# Patient Record
Sex: MALE | Race: WHITE | NOT HISPANIC OR LATINO | Employment: OTHER | URBAN - METROPOLITAN AREA
[De-identification: names, ages, dates, MRNs, and addresses within clinical notes are randomized per-mention and may not be internally consistent; named-entity substitution may affect disease eponyms.]

---

## 2023-05-08 ENCOUNTER — HOSPITAL ENCOUNTER (OUTPATIENT)
Dept: DATA CONVERSION | Facility: HOSPITAL | Age: 68
End: 2023-05-08
Attending: INTERNAL MEDICINE
Payer: MEDICARE

## 2023-05-08 DIAGNOSIS — Z95.1 PRESENCE OF AORTOCORONARY BYPASS GRAFT: ICD-10-CM

## 2023-05-08 DIAGNOSIS — K63.89 OTHER SPECIFIED DISEASES OF INTESTINE: ICD-10-CM

## 2023-05-08 DIAGNOSIS — K57.30 DIVERTICULOSIS OF LARGE INTESTINE WITHOUT PERFORATION OR ABSCESS WITHOUT BLEEDING: ICD-10-CM

## 2023-05-08 DIAGNOSIS — I25.10 ATHEROSCLEROTIC HEART DISEASE OF NATIVE CORONARY ARTERY WITHOUT ANGINA PECTORIS: ICD-10-CM

## 2023-05-08 DIAGNOSIS — Z86.010 PERSONAL HISTORY OF COLONIC POLYPS: ICD-10-CM

## 2023-05-08 DIAGNOSIS — Z12.11 ENCOUNTER FOR SCREENING FOR MALIGNANT NEOPLASM OF COLON: ICD-10-CM

## 2023-05-08 DIAGNOSIS — E78.5 HYPERLIPIDEMIA, UNSPECIFIED: ICD-10-CM

## 2023-05-08 DIAGNOSIS — I10 ESSENTIAL (PRIMARY) HYPERTENSION: ICD-10-CM

## 2023-05-19 LAB
COMPLETE PATHOLOGY REPORT: NORMAL
CONVERTED CLINICAL DIAGNOSIS-HISTORY: NORMAL
CONVERTED FINAL DIAGNOSIS: NORMAL
CONVERTED FINAL REPORT PDF LINK TO COPY AND PASTE: NORMAL
CONVERTED GROSS DESCRIPTION: NORMAL

## 2023-08-31 ENCOUNTER — HOSPITAL ENCOUNTER (OUTPATIENT)
Dept: DATA CONVERSION | Facility: HOSPITAL | Age: 68
Discharge: HOME | End: 2023-08-31
Payer: MEDICARE

## 2023-08-31 DIAGNOSIS — I25.10 ATHEROSCLEROTIC HEART DISEASE OF NATIVE CORONARY ARTERY WITHOUT ANGINA PECTORIS: ICD-10-CM

## 2023-09-11 ENCOUNTER — HOSPITAL ENCOUNTER (OUTPATIENT)
Dept: DATA CONVERSION | Facility: HOSPITAL | Age: 68
Discharge: HOME | End: 2023-09-11
Payer: MEDICARE

## 2023-09-11 DIAGNOSIS — E78.5 HYPERLIPIDEMIA, UNSPECIFIED: ICD-10-CM

## 2023-09-11 LAB
ALT SERPL-CCNC: 27 U/L (ref 5–40)
APPEARANCE PLAS: ABNORMAL
AST SERPL-CCNC: 30 U/L (ref 5–40)
CHOLEST SERPL-MCNC: 229 MG/DL (ref 133–200)
CHOLEST/HDLC SERPL: 4.5 RATIO
COLOR SPUN FLD: ABNORMAL
FASTING STATUS PATIENT QL REPORTED: ABNORMAL
HDLC SERPL-MCNC: 51 MG/DL
LDLC SERPL CALC-MCNC: 151 MG/DL (ref 65–130)
TRIGL SERPL-MCNC: 133 MG/DL (ref 40–150)

## 2023-09-15 VITALS
RESPIRATION RATE: 15 BRPM | SYSTOLIC BLOOD PRESSURE: 100 MMHG | BODY MASS INDEX: 30.51 KG/M2 | DIASTOLIC BLOOD PRESSURE: 60 MMHG | HEART RATE: 56 BPM | WEIGHT: 206 LBS | HEIGHT: 69 IN

## 2023-11-03 PROBLEM — D64.9 ANEMIA: Status: ACTIVE | Noted: 2023-11-03

## 2023-11-03 PROBLEM — K63.5 COLON POLYP: Status: ACTIVE | Noted: 2023-11-03

## 2023-11-03 PROBLEM — N20.0 NEPHROLITHIASIS: Status: ACTIVE | Noted: 2023-11-03

## 2023-11-03 PROBLEM — I25.10 CORONARY ARTERY DISEASE: Status: ACTIVE | Noted: 2023-11-03

## 2023-11-03 PROBLEM — K92.1 HEMATOCHEZIA: Status: ACTIVE | Noted: 2023-11-03

## 2023-11-03 PROBLEM — E66.9 OBESITY: Status: ACTIVE | Noted: 2023-11-03

## 2023-11-03 PROBLEM — E66.3 OVERWEIGHT: Status: ACTIVE | Noted: 2023-11-03

## 2023-11-03 PROBLEM — Z95.1 HISTORY OF CORONARY ARTERY BYPASS GRAFT: Status: ACTIVE | Noted: 2023-11-03

## 2023-11-03 PROBLEM — M1A.10X0 LEAD-INDUCED CHRONIC GOUT, UNSPECIFIED SITE, WITHOUT TOPHUS (TOPHI): Status: ACTIVE | Noted: 2023-11-03

## 2023-11-03 PROBLEM — M10.9 GOUT: Status: ACTIVE | Noted: 2023-11-03

## 2023-11-03 PROBLEM — H26.9 CATARACTS, BOTH EYES: Status: ACTIVE | Noted: 2023-11-03

## 2023-11-03 PROBLEM — J98.11 ATELECTASIS: Status: ACTIVE | Noted: 2023-11-03

## 2023-11-03 PROBLEM — E78.5 HYPERLIPIDEMIA: Status: ACTIVE | Noted: 2023-11-03

## 2023-11-03 PROBLEM — K52.9 COLITIS: Status: ACTIVE | Noted: 2023-11-03

## 2023-11-03 PROBLEM — H43.811 PVD (POSTERIOR VITREOUS DETACHMENT), RIGHT EYE: Status: ACTIVE | Noted: 2023-11-03

## 2023-11-03 PROBLEM — M1A.00X0 IDIOPATHIC CHRONIC GOUT WITHOUT TOPHUS: Status: ACTIVE | Noted: 2023-11-03

## 2023-11-03 PROBLEM — F41.9 ANXIETY: Status: ACTIVE | Noted: 2023-11-03

## 2023-11-03 PROBLEM — T56.0X1A LEAD-INDUCED CHRONIC GOUT, UNSPECIFIED SITE, WITHOUT TOPHUS (TOPHI): Status: ACTIVE | Noted: 2023-11-03

## 2023-11-03 PROBLEM — I25.810 ARTERIOSCLEROSIS OF CORONARY ARTERY BYPASS GRAFT: Status: ACTIVE | Noted: 2023-11-03

## 2023-11-03 PROBLEM — M10.9 ACUTE GOUT: Status: ACTIVE | Noted: 2023-11-03

## 2023-11-03 PROBLEM — E78.2 MIXED HYPERLIPIDEMIA: Status: ACTIVE | Noted: 2023-11-03

## 2023-11-03 PROBLEM — T56.0X1S: Status: ACTIVE | Noted: 2023-11-03

## 2023-11-03 PROBLEM — D31.31 CHOROIDAL NEVUS OF RIGHT EYE: Status: ACTIVE | Noted: 2023-11-03

## 2023-11-03 RX ORDER — METOPROLOL TARTRATE 25 MG/1
0.5 TABLET, FILM COATED ORAL 2 TIMES DAILY
COMMUNITY
End: 2024-01-04

## 2023-11-03 RX ORDER — CLOPIDOGREL BISULFATE 75 MG/1
1 TABLET ORAL DAILY
COMMUNITY

## 2023-11-03 RX ORDER — EZETIMIBE 10 MG/1
1 TABLET ORAL DAILY
COMMUNITY
Start: 2022-08-18

## 2023-11-03 RX ORDER — POLYETHYLENE GLYCOL-3350 AND ELECTROLYTES 236; 6.74; 5.86; 2.97; 22.74 G/274.31G; G/274.31G; G/274.31G; G/274.31G; G/274.31G
POWDER, FOR SOLUTION ORAL
COMMUNITY
Start: 2021-08-05 | End: 2023-11-06 | Stop reason: ALTCHOICE

## 2023-11-03 RX ORDER — ATORVASTATIN CALCIUM 40 MG/1
40 TABLET, FILM COATED ORAL DAILY
COMMUNITY
Start: 2023-02-11 | End: 2023-05-12 | Stop reason: WASHOUT

## 2023-11-03 RX ORDER — POLYETHYLENE GLYCOL 3350 17 G/17G
POWDER, FOR SOLUTION ORAL
COMMUNITY
Start: 2021-08-05 | End: 2023-11-06 | Stop reason: ALTCHOICE

## 2023-11-03 RX ORDER — VIT C/E/ZN/COPPR/LUTEIN/ZEAXAN 250MG-90MG
CAPSULE ORAL
COMMUNITY

## 2023-11-03 RX ORDER — ALLOPURINOL 100 MG/1
1 TABLET ORAL DAILY
COMMUNITY

## 2023-11-03 RX ORDER — EVOLOCUMAB 140 MG/ML
INJECTION, SOLUTION SUBCUTANEOUS
COMMUNITY
Start: 2023-09-15 | End: 2023-11-06

## 2023-11-06 ENCOUNTER — OFFICE VISIT (OUTPATIENT)
Dept: PRIMARY CARE | Facility: CLINIC | Age: 68
End: 2023-11-06
Payer: MEDICARE

## 2023-11-06 VITALS
HEART RATE: 78 BPM | OXYGEN SATURATION: 97 % | SYSTOLIC BLOOD PRESSURE: 122 MMHG | HEIGHT: 70 IN | DIASTOLIC BLOOD PRESSURE: 68 MMHG | BODY MASS INDEX: 29.49 KG/M2 | WEIGHT: 206 LBS

## 2023-11-06 DIAGNOSIS — Z11.59 ENCOUNTER FOR HEPATITIS C SCREENING TEST FOR LOW RISK PATIENT: ICD-10-CM

## 2023-11-06 DIAGNOSIS — I25.810 ATHEROSCLEROSIS OF CORONARY ARTERY BYPASS GRAFT OF NATIVE HEART WITHOUT ANGINA PECTORIS: ICD-10-CM

## 2023-11-06 DIAGNOSIS — Z00.00 ROUTINE GENERAL MEDICAL EXAMINATION AT HEALTH CARE FACILITY: Primary | ICD-10-CM

## 2023-11-06 DIAGNOSIS — Z12.5 SCREENING PSA (PROSTATE SPECIFIC ANTIGEN): ICD-10-CM

## 2023-11-06 LAB
POC APPEARANCE, URINE: CLEAR
POC BILIRUBIN, URINE: NEGATIVE
POC BLOOD, URINE: NEGATIVE
POC COLOR, URINE: YELLOW
POC GLUCOSE, URINE: NEGATIVE MG/DL
POC KETONES, URINE: NEGATIVE MG/DL
POC LEUKOCYTES, URINE: NEGATIVE
POC NITRITE,URINE: NEGATIVE
POC PH, URINE: 5.5 PH
POC PROTEIN, URINE: ABNORMAL MG/DL
POC SPECIFIC GRAVITY, URINE: >=1.03
POC UROBILINOGEN, URINE: 0.2 EU/DL

## 2023-11-06 PROCEDURE — 1036F TOBACCO NON-USER: CPT | Performed by: FAMILY MEDICINE

## 2023-11-06 PROCEDURE — G0439 PPPS, SUBSEQ VISIT: HCPCS | Performed by: FAMILY MEDICINE

## 2023-11-06 PROCEDURE — 1126F AMNT PAIN NOTED NONE PRSNT: CPT | Performed by: FAMILY MEDICINE

## 2023-11-06 PROCEDURE — 81003 URINALYSIS AUTO W/O SCOPE: CPT | Performed by: FAMILY MEDICINE

## 2023-11-06 PROCEDURE — 1159F MED LIST DOCD IN RCRD: CPT | Performed by: FAMILY MEDICINE

## 2023-11-06 RX ORDER — MULTIVIT-MIN/IRON FUM/FOLIC AC 7.5 MG-4
1 TABLET ORAL DAILY
COMMUNITY

## 2023-11-06 ASSESSMENT — ENCOUNTER SYMPTOMS
CARDIOVASCULAR NEGATIVE: 1
EYES NEGATIVE: 1
MUSCULOSKELETAL NEGATIVE: 1
RESPIRATORY NEGATIVE: 1
PSYCHIATRIC NEGATIVE: 1
HEMATOLOGIC/LYMPHATIC NEGATIVE: 1
GASTROINTESTINAL NEGATIVE: 1
NEUROLOGICAL NEGATIVE: 1
CONSTITUTIONAL NEGATIVE: 1
ENDOCRINE NEGATIVE: 1

## 2023-11-06 ASSESSMENT — PAIN SCALES - GENERAL: PAINLEVEL: 0-NO PAIN

## 2023-11-06 ASSESSMENT — PATIENT HEALTH QUESTIONNAIRE - PHQ9
SUM OF ALL RESPONSES TO PHQ9 QUESTIONS 1 AND 2: 0
1. LITTLE INTEREST OR PLEASURE IN DOING THINGS: NOT AT ALL
2. FEELING DOWN, DEPRESSED OR HOPELESS: NOT AT ALL

## 2023-11-06 ASSESSMENT — COGNITIVE AND FUNCTIONAL STATUS - GENERAL: VERBAL FLUENCY - ANIMAL NAMES (0 TO 25): 3

## 2023-11-06 NOTE — PROGRESS NOTES
[facility]: [facility]  MEDICARE WELLNESS EXAM      Michael Schwarz is a 67 y.o. male that is presenting today for Annual Exam.    Concerns:    Subjective   Chest pains -felt the same as his heart attack pain, lasted 10s at a time.  Called cardiology and saw Dr Arnold in August. Symptoms resolved by follow up in Sept.     Mixed hyperlipidemia-  had LFT elevation on atorvostatin so they stopped it.  Repatha was ordered but too expensive.       Review of Systems   Constitutional: Negative.    HENT: Negative.     Eyes: Negative.    Respiratory: Negative.     Cardiovascular: Negative.    Gastrointestinal: Negative.    Endocrine: Negative.    Genitourinary: Negative.    Musculoskeletal: Negative.    Skin:  Positive for rash.   Neurological: Negative.    Hematological: Negative.    Psychiatric/Behavioral: Negative.         Other Providers:  Dr Arnold.     Hearing Changes: no    Cognitive Assessment: mini-cog    Depression Screening: negative     ACTIVITIES OF DAILY LIVING:  Basic ADLs:  Problems with Bathing, Dressing, Toileting, Transferring, Continence, Feeding No  Instrumental ADLs:  No problems with Ability to use phone, Shopping, Cooking, House-keeping, Laundry, Transportation, Medication Management, Finance Management No    Advanced Care Planning was discussed with patient:  The patient does not have an advanced care plan on file. The patient does not have an active surrogate decision-maker on file.    History    No past medical history on file.  No past surgical history on file.  Family History   Problem Relation Name Age of Onset    Stroke Mother      Diabetes Father      Heart disease Father      Arthritis Sister      No Known Problems Sister      Other (mini stroke) Brother      Stroke Brother      No Known Problems Daughter      No Known Problems Son      No Known Problems Son       No Known Allergies  Current Outpatient Medications on File Prior to Visit   Medication Sig Dispense Refill     allopurinol (Zyloprim) 100 mg tablet Take 1 tablet (100 mg) by mouth once daily.      cholecalciferol (Vitamin D-3) 25 MCG (1000 UT) capsule 1 capsule Orally Once a day      clopidogrel (Plavix) 75 mg tablet Take 1 tablet (75 mg) by mouth once daily.      ezetimibe (Zetia) 10 mg tablet Take 1 tablet (10 mg) by mouth once daily.      metoprolol tartrate (Lopressor) 25 mg tablet Take 0.5 tablets (12.5 mg) by mouth 2 times a day.      multivitamin with minerals tablet Take 1 tablet by mouth once daily.      evolocumab (Repatha Syringe) 140 mg/mL injection 1 mL Subcutaneous every 2 weeks for 30 days      polyethylene glycol (Glycolax, Miralax) 17 gram/dose powder USE AS DIRECTED: Qty 1 X 238 GM Bottle      polyethylene glycol-electrolytes (GaviLyte-G) 420 gram solution Please follow the printed instructions that were mailed to you.      [DISCONTINUED] atorvastatin (Lipitor) 40 mg tablet Take 1 tablet (40 mg) by mouth once daily.       No current facility-administered medications on file prior to visit.     Immunization History   Administered Date(s) Administered    Flu vaccine (IIV4), preservative free *Check age/dose* 09/15/2020    Flu vaccine, quadrivalent, high-dose, preservative free, age 65y+ (FLUZONE) 09/15/2021, 10/02/2023    Hepatitis B vaccine, adult (RECOMBIVAX, ENGERIX) 04/05/2019    Hepatitis B vaccine, pediatric/adolescent (RECOMBIVAX, ENGERIX) 10/05/2018, 11/05/2018    Influenza, Seasonal, Quadrivalent, Adjuvanted 01/11/2023    Pfizer COVID-19 vaccine, Fall 2023, 12 years and older, (30mcg/0.3mL) 10/02/2023    Pfizer COVID-19 vaccine, bivalent, age 12 years and older (30 mcg/0.3 mL) 01/12/2023    Pfizer Gray Cap SARS-CoV-2 06/15/2022    Pfizer Purple Cap SARS-CoV-2 02/26/2021, 03/26/2021, 11/12/2021    Pneumococcal conjugate vaccine, 13-valent (PREVNAR 13) 05/13/2021    Tdap vaccine, age 7 year and older (BOOSTRIX) 04/15/2016    Zoster vaccine, recombinant, adult (SHINGRIX) 10/27/2020    Zoster, live  04/15/2016     Patient's medical history was reviewed and updated either before or during this encounter.    Objective   Vitals:    11/06/23 1415   BP: 122/68   Pulse: 78   SpO2: 97%      Physical Exam  Constitutional:       General: He is not in acute distress.     Appearance: Normal appearance. He is not ill-appearing.   HENT:      Right Ear: Tympanic membrane, ear canal and external ear normal. There is no impacted cerumen.      Left Ear: Tympanic membrane, ear canal and external ear normal.      Nose: Nose normal.      Mouth/Throat:      Pharynx: Oropharynx is clear. No posterior oropharyngeal erythema.   Neck:      Thyroid: No thyroid mass or thyroid tenderness.      Vascular: No carotid bruit.   Cardiovascular:      Rate and Rhythm: Normal rate and regular rhythm.      Pulses:           Radial pulses are 2+ on the right side and 2+ on the left side.        Dorsalis pedis pulses are 2+ on the right side and 2+ on the left side.      Heart sounds: Normal heart sounds. No murmur heard.     No friction rub. No gallop.   Pulmonary:      Effort: Pulmonary effort is normal.      Breath sounds: Normal breath sounds.   Abdominal:      General: Bowel sounds are normal.      Palpations: Abdomen is soft. There is no hepatomegaly or splenomegaly.      Tenderness: There is no abdominal tenderness.   Musculoskeletal:      Cervical back: Normal range of motion. No tenderness.      Right lower leg: No edema.      Left lower leg: No edema.      Comments: No gross abnormalities    Lymphadenopathy:      Cervical: No cervical adenopathy.      Upper Body:      Right upper body: No supraclavicular adenopathy.      Left upper body: No supraclavicular adenopathy.   Skin:     General: Skin is warm.      Capillary Refill: Capillary refill takes 2 to 3 seconds.   Neurological:      General: No focal deficit present.      Mental Status: He is alert.      Cranial Nerves: Cranial nerves 2-12 are intact.      Coordination: Coordination is  intact.      Gait: Gait is intact.      Comments: No obvious neurological deficits    Psychiatric:         Mood and Affect: Mood and affect normal.       Mobility Assessment: get up and go test <30 seconds- Yes.       Assessment/Plan    There are no diagnoses linked to this encounter.  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, accidental (unintentional), sequela    Anemia    Anxiety    Atelectasis    Cataracts, both eyes    Choroidal nevus of right eye     Advance Care Planning   Diagnoses and all orders for this visit:  Routine general medical examination at health care facility  Atherosclerosis of coronary artery bypass graft of native heart without angina pectoris  Screening PSA (prostate specific antigen)  Encounter for hepatitis C screening test for low risk patient    The patient was encouraged to ensure that any/all documentation is accurate and up to date, and that our office be provided a copy in the event that anything changes.    Orlin Puentes MD

## 2024-01-03 DIAGNOSIS — I25.10 CORONARY ARTERY DISEASE INVOLVING NATIVE CORONARY ARTERY OF NATIVE HEART, UNSPECIFIED WHETHER ANGINA PRESENT: Primary | ICD-10-CM

## 2024-01-04 RX ORDER — METOPROLOL TARTRATE 25 MG/1
TABLET, FILM COATED ORAL
Qty: 180 TABLET | Refills: 2 | Status: SHIPPED | OUTPATIENT
Start: 2024-01-04

## 2024-01-22 ENCOUNTER — LAB (OUTPATIENT)
Dept: LAB | Facility: LAB | Age: 69
End: 2024-01-22
Payer: MEDICARE

## 2024-01-22 DIAGNOSIS — E78.5 HYPERLIPIDEMIA, UNSPECIFIED: Primary | ICD-10-CM

## 2024-01-22 DIAGNOSIS — I25.810 ATHEROSCLEROSIS OF CORONARY ARTERY BYPASS GRAFT OF NATIVE HEART WITHOUT ANGINA PECTORIS: ICD-10-CM

## 2024-01-22 DIAGNOSIS — Z12.5 SCREENING PSA (PROSTATE SPECIFIC ANTIGEN): ICD-10-CM

## 2024-01-22 DIAGNOSIS — Z11.59 ENCOUNTER FOR HEPATITIS C SCREENING TEST FOR LOW RISK PATIENT: ICD-10-CM

## 2024-01-22 LAB
ALBUMIN SERPL-MCNC: 4.3 G/DL (ref 3.5–5)
ALP BLD-CCNC: 86 U/L (ref 35–125)
ALT SERPL-CCNC: 69 U/L (ref 5–40)
ANION GAP SERPL CALC-SCNC: 10 MMOL/L
AST SERPL-CCNC: 45 U/L (ref 5–40)
BILIRUB SERPL-MCNC: 0.4 MG/DL (ref 0.1–1.2)
BUN SERPL-MCNC: 21 MG/DL (ref 8–25)
CALCIUM SERPL-MCNC: 9.6 MG/DL (ref 8.5–10.4)
CHLORIDE SERPL-SCNC: 104 MMOL/L (ref 97–107)
CHOLEST SERPL-MCNC: 155 MG/DL (ref 133–200)
CHOLEST/HDLC SERPL: 2.6 {RATIO}
CO2 SERPL-SCNC: 28 MMOL/L (ref 24–31)
CREAT SERPL-MCNC: 1.4 MG/DL (ref 0.4–1.6)
EGFRCR SERPLBLD CKD-EPI 2021: 55 ML/MIN/1.73M*2
GLUCOSE SERPL-MCNC: 84 MG/DL (ref 65–99)
HCV AB SER QL: NONREACTIVE
HDLC SERPL-MCNC: 60 MG/DL
LDLC SERPL CALC-MCNC: 79 MG/DL (ref 65–130)
POTASSIUM SERPL-SCNC: 4.5 MMOL/L (ref 3.4–5.1)
PROT SERPL-MCNC: 7.2 G/DL (ref 5.9–7.9)
PSA SERPL-MCNC: 3.8 NG/ML
SODIUM SERPL-SCNC: 142 MMOL/L (ref 133–145)
TRIGL SERPL-MCNC: 81 MG/DL (ref 40–150)

## 2024-01-22 PROCEDURE — 36415 COLL VENOUS BLD VENIPUNCTURE: CPT

## 2024-01-22 PROCEDURE — G0103 PSA SCREENING: HCPCS

## 2024-01-22 PROCEDURE — 80053 COMPREHEN METABOLIC PANEL: CPT

## 2024-01-22 PROCEDURE — 80061 LIPID PANEL: CPT

## 2024-01-22 PROCEDURE — 86803 HEPATITIS C AB TEST: CPT

## 2024-01-24 ENCOUNTER — TELEMEDICINE (OUTPATIENT)
Dept: PRIMARY CARE | Facility: CLINIC | Age: 69
End: 2024-01-24
Payer: MEDICARE

## 2024-01-24 VITALS — WEIGHT: 208 LBS | HEIGHT: 70 IN | BODY MASS INDEX: 29.78 KG/M2

## 2024-01-24 DIAGNOSIS — E78.2 MIXED HYPERLIPIDEMIA: Primary | ICD-10-CM

## 2024-01-24 PROBLEM — M1A.10X0 LEAD-INDUCED CHRONIC GOUT, UNSPECIFIED SITE, WITHOUT TOPHUS (TOPHI): Status: RESOLVED | Noted: 2023-11-03 | Resolved: 2024-01-24

## 2024-01-24 PROBLEM — T56.0X1S: Status: RESOLVED | Noted: 2023-11-03 | Resolved: 2024-01-24

## 2024-01-24 PROBLEM — K63.5 COLON POLYP: Status: RESOLVED | Noted: 2023-11-03 | Resolved: 2024-01-24

## 2024-01-24 PROBLEM — M10.9 ACUTE GOUT: Status: RESOLVED | Noted: 2023-11-03 | Resolved: 2024-01-24

## 2024-01-24 PROBLEM — J98.11 ATELECTASIS: Status: RESOLVED | Noted: 2023-11-03 | Resolved: 2024-01-24

## 2024-01-24 PROBLEM — E66.9 OBESITY: Status: RESOLVED | Noted: 2023-11-03 | Resolved: 2024-01-24

## 2024-01-24 PROBLEM — D31.31 CHOROIDAL NEVUS OF RIGHT EYE: Status: RESOLVED | Noted: 2023-11-03 | Resolved: 2024-01-24

## 2024-01-24 PROBLEM — M10.9 GOUT: Status: RESOLVED | Noted: 2023-11-03 | Resolved: 2024-01-24

## 2024-01-24 PROBLEM — I25.810 ARTERIOSCLEROSIS OF CORONARY ARTERY BYPASS GRAFT: Status: RESOLVED | Noted: 2023-11-03 | Resolved: 2024-01-24

## 2024-01-24 PROBLEM — T56.0X1A LEAD-INDUCED CHRONIC GOUT, UNSPECIFIED SITE, WITHOUT TOPHUS (TOPHI): Status: RESOLVED | Noted: 2023-11-03 | Resolved: 2024-01-24

## 2024-01-24 PROBLEM — D64.9 ANEMIA: Status: RESOLVED | Noted: 2023-11-03 | Resolved: 2024-01-24

## 2024-01-24 PROBLEM — E66.3 OVERWEIGHT: Status: RESOLVED | Noted: 2023-11-03 | Resolved: 2024-01-24

## 2024-01-24 PROBLEM — K92.1 HEMATOCHEZIA: Status: RESOLVED | Noted: 2023-11-03 | Resolved: 2024-01-24

## 2024-01-24 PROBLEM — K52.9 COLITIS: Status: RESOLVED | Noted: 2023-11-03 | Resolved: 2024-01-24

## 2024-01-24 PROBLEM — E78.5 HYPERLIPIDEMIA: Status: RESOLVED | Noted: 2023-11-03 | Resolved: 2024-01-24

## 2024-01-24 PROCEDURE — 99441 PR PHYS/QHP TELEPHONE EVALUATION 5-10 MIN: CPT | Performed by: FAMILY MEDICINE

## 2024-01-24 ASSESSMENT — ENCOUNTER SYMPTOMS
SHORTNESS OF BREATH: 0
CHEST TIGHTNESS: 0

## 2024-01-24 ASSESSMENT — PATIENT HEALTH QUESTIONNAIRE - PHQ9
2. FEELING DOWN, DEPRESSED OR HOPELESS: NOT AT ALL
1. LITTLE INTEREST OR PLEASURE IN DOING THINGS: NOT AT ALL
SUM OF ALL RESPONSES TO PHQ9 QUESTIONS 1 AND 2: 0

## 2024-01-24 ASSESSMENT — PAIN SCALES - GENERAL: PAINLEVEL: 1

## 2024-01-24 NOTE — PROGRESS NOTES
"[facility]: [facility]  E/M EVALUATION    Michael Schwarz is a 68 y.o. male who presents for Results (Patient is wanting to discuss recent lab results.dd).    Subjective   Telemed-phone only.      Pt here to discuss labs.  Tolerating meds.  Has cardiology appt in May. He feels well.        Review of Systems   Respiratory:  Negative for chest tightness and shortness of breath.        Objective   There were no vitals filed for this visit.  Physical Exam - no exam.   Cholesterol   Date Value Ref Range Status   01/22/2024 155 133 - 200 mg/dL Final     Triglycerides   Date Value Ref Range Status   01/22/2024 81 40 - 150 mg/dL Final     HDL-Cholesterol   Date Value Ref Range Status   01/22/2024 60.0 >40.0 mg/dL Final     Comment:     National Cholesterol Education Program (NCFP) guidelines:  <40 mg/dL: Low HDL-cholesterol (major risk factor for CHD)  >60 mg/dL: High HDL-cholesterol (\"negative\"risk factor for CHD)  HDL-cholesterol is affected by a number of factors (e.g., smoking, exercise, hormones, sex and age).       LDL Calculated   Date Value Ref Range Status   01/22/2024 79 65 - 130 mg/dL Final     Cholesterol/HDL Ratio   Date Value Ref Range Status   01/22/2024 2.6 SEE COMMENT Final     Comment:     According to the American Heart Association, the goal is to maintain the total Cholesterol/HDL ratio at 5-to-1, or lower, with an optimum ratio of 3.5-to-1.     Glucose   Date Value Ref Range Status   01/22/2024 84 65 - 99 mg/dL Final     Sodium   Date Value Ref Range Status   01/22/2024 142 133 - 145 mmol/L Final     Potassium   Date Value Ref Range Status   01/22/2024 4.5 3.4 - 5.1 mmol/L Final     ALT   Date Value Ref Range Status   01/22/2024 69 (H) 5 - 40 U/L Final     eGFR   Date Value Ref Range Status   01/22/2024 55 (L) >60 mL/min/1.73m*2 Final     Comment:     Calculations of estimated GFR are performed using the 2021 CKD-EPI Study Refit equation without the race variable for the IDMS-Traceable " creatinine methods.  https://jasn.asnjournals.org/content/early/2021/09/22/ASN.6344225772     INR   Date Value Ref Range Status   05/19/2022 1.1 0.86 - 1.16 Final     Comment:     INR Therapeutic Range: 2.0-3.5     Hemoglobin A1C   Date Value Ref Range Status   05/21/2022 5.2 4.0 - 6.0 % Final     Comment:     Hemoglobin A1C levels are related to mean blood glucose during the   preceding 2-3 months. The relationship table below may be used as a   general guide. Each 1% increase in HGB A1C is a reflection of an   increase in mean glucose of approximately 30 mg/dl.   Reference: Diabetes Care, volume 29, supplement 1 Jan. 2006                        HGB A1C ................. Approx. Mean Glucose   _______________________________________________   6%   ...............................  120 mg/dl   7%   ...............................  150 mg/dl   8%   ...............................  180 mg/dl   9%   ...............................  210 mg/dl   10%  ...............................  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  Follow up cardiology as planned.  Repeat labs in 6 months for liver and kidney tests     The patient was encouraged to  ensure that any/all documentation is accurate and up to date, and that our office be provided a copy in the event that anything changes.         Orlin Puentes MD

## 2024-06-10 DIAGNOSIS — I25.10 ATHEROSCLEROTIC HEART DISEASE OF NATIVE CORONARY ARTERY WITHOUT ANGINA PECTORIS: ICD-10-CM

## 2024-06-12 RX ORDER — ATORVASTATIN CALCIUM 40 MG/1
40 TABLET, FILM COATED ORAL DAILY
Qty: 90 TABLET | Refills: 3 | Status: SHIPPED | OUTPATIENT
Start: 2024-06-12 | End: 2025-06-07

## 2024-06-12 RX ORDER — CLOPIDOGREL BISULFATE 75 MG/1
75 TABLET ORAL DAILY
Qty: 90 TABLET | Refills: 3 | Status: SHIPPED | OUTPATIENT
Start: 2024-06-12

## 2024-06-20 ENCOUNTER — TELEPHONE (OUTPATIENT)
Dept: PRIMARY CARE | Facility: CLINIC | Age: 69
End: 2024-06-20
Payer: MEDICARE

## 2024-06-20 DIAGNOSIS — Z79.2 NEED FOR ANTIBIOTIC PROPHYLAXIS FOR DENTAL PROCEDURE: Primary | ICD-10-CM

## 2024-06-20 RX ORDER — AMOXICILLIN 500 MG/1
2000 CAPSULE ORAL DAILY
Qty: 4 CAPSULE | Refills: 0 | Status: SHIPPED | OUTPATIENT
Start: 2024-06-20 | End: 2024-06-21

## 2024-06-20 NOTE — TELEPHONE ENCOUNTER
Patient had lipid panel done in January and is wanting to know if he is due for another one?    Patient said he also has a dentist cleaning for next Tuesday and since he had heart surgery in the past he would like to have Amoxicillin sent in prior to?    Please call the patient once medication is ordered.

## 2024-06-21 NOTE — TELEPHONE ENCOUNTER
Spoke with patient, confirmed information. He said the dentist mentioned the antibiotics which is why he asked.

## 2024-06-21 NOTE — TELEPHONE ENCOUNTER
Patient left a message returning our call, I attempted to call him back and his phone went right to voicemail, I was going to leave a message but his mailbox was full.

## 2024-08-04 DIAGNOSIS — E78.2 MIXED HYPERLIPIDEMIA: Primary | ICD-10-CM

## 2024-08-05 RX ORDER — EZETIMIBE 10 MG/1
10 TABLET ORAL DAILY
Qty: 90 TABLET | Refills: 3 | Status: SHIPPED | OUTPATIENT
Start: 2024-08-05

## 2024-11-04 DIAGNOSIS — M1A.00X0 IDIOPATHIC CHRONIC GOUT, UNSPECIFIED SITE, WITHOUT TOPHUS (TOPHI): ICD-10-CM

## 2024-11-04 RX ORDER — ALLOPURINOL 100 MG/1
100 TABLET ORAL DAILY
Qty: 90 TABLET | Refills: 4 | Status: SHIPPED | OUTPATIENT
Start: 2024-11-04

## 2025-01-30 LAB
CHOLEST SERPL-MCNC: 153 MG/DL
CHOLEST/HDLC SERPL: 2.8 (CALC)
HDLC SERPL-MCNC: 55 MG/DL
LDLC SERPL CALC-MCNC: 82 MG/DL (CALC)
NONHDLC SERPL-MCNC: 98 MG/DL (CALC)
TRIGL SERPL-MCNC: 80 MG/DL

## 2025-02-04 ASSESSMENT — PROMIS GLOBAL HEALTH SCALE
CARRYOUT_SOCIAL_ACTIVITIES: VERY GOOD
CARRYOUT_PHYSICAL_ACTIVITIES: COMPLETELY
RATE_AVERAGE_PAIN: 1
EMOTIONAL_PROBLEMS: RARELY
RATE_MENTAL_HEALTH: VERY GOOD
RATE_SOCIAL_SATISFACTION: GOOD
RATE_AVERAGE_FATIGUE: MILD
RATE_PHYSICAL_HEALTH: VERY GOOD
RATE_QUALITY_OF_LIFE: VERY GOOD
RATE_GENERAL_HEALTH: VERY GOOD

## 2025-02-05 ENCOUNTER — OFFICE VISIT (OUTPATIENT)
Dept: PRIMARY CARE | Facility: CLINIC | Age: 70
End: 2025-02-05
Payer: MEDICARE

## 2025-02-05 VITALS
OXYGEN SATURATION: 98 % | WEIGHT: 210 LBS | HEART RATE: 71 BPM | BODY MASS INDEX: 30.57 KG/M2 | DIASTOLIC BLOOD PRESSURE: 70 MMHG | SYSTOLIC BLOOD PRESSURE: 122 MMHG

## 2025-02-05 DIAGNOSIS — M1A.00X0 IDIOPATHIC CHRONIC GOUT WITHOUT TOPHUS, UNSPECIFIED SITE: ICD-10-CM

## 2025-02-05 DIAGNOSIS — Z00.00 ROUTINE GENERAL MEDICAL EXAMINATION AT HEALTH CARE FACILITY: Primary | ICD-10-CM

## 2025-02-05 DIAGNOSIS — Z12.5 SCREENING PSA (PROSTATE SPECIFIC ANTIGEN): ICD-10-CM

## 2025-02-05 DIAGNOSIS — I25.10 ASHD (ARTERIOSCLEROTIC HEART DISEASE): ICD-10-CM

## 2025-02-05 DIAGNOSIS — Z12.11 ENCOUNTER FOR SCREENING FOR MALIGNANT NEOPLASM OF COLON: ICD-10-CM

## 2025-02-05 LAB
POC APPEARANCE, URINE: CLEAR
POC BILIRUBIN, URINE: NEGATIVE
POC BLOOD, URINE: NEGATIVE
POC COLOR, URINE: YELLOW
POC GLUCOSE, URINE: NEGATIVE MG/DL
POC KETONES, URINE: NEGATIVE MG/DL
POC LEUKOCYTES, URINE: NEGATIVE
POC NITRITE,URINE: NEGATIVE
POC PH, URINE: 5.5 PH
POC PROTEIN, URINE: NEGATIVE MG/DL
POC SPECIFIC GRAVITY, URINE: >=1.03
POC UROBILINOGEN, URINE: 0.2 EU/DL

## 2025-02-05 PROCEDURE — 1159F MED LIST DOCD IN RCRD: CPT | Performed by: FAMILY MEDICINE

## 2025-02-05 PROCEDURE — 1036F TOBACCO NON-USER: CPT | Performed by: FAMILY MEDICINE

## 2025-02-05 PROCEDURE — 99215 OFFICE O/P EST HI 40 MIN: CPT | Mod: 25 | Performed by: FAMILY MEDICINE

## 2025-02-05 PROCEDURE — 99213 OFFICE O/P EST LOW 20 MIN: CPT | Performed by: FAMILY MEDICINE

## 2025-02-05 PROCEDURE — 1126F AMNT PAIN NOTED NONE PRSNT: CPT | Performed by: FAMILY MEDICINE

## 2025-02-05 PROCEDURE — 81003 URINALYSIS AUTO W/O SCOPE: CPT | Mod: QW | Performed by: FAMILY MEDICINE

## 2025-02-05 PROCEDURE — G0439 PPPS, SUBSEQ VISIT: HCPCS | Performed by: FAMILY MEDICINE

## 2025-02-05 ASSESSMENT — PAIN SCALES - GENERAL: PAINLEVEL_OUTOF10: 0-NO PAIN

## 2025-02-05 ASSESSMENT — ENCOUNTER SYMPTOMS
WEAKNESS: 0
ARTHRALGIAS: 1
CONSTITUTIONAL NEGATIVE: 1
MYALGIAS: 1
RESPIRATORY NEGATIVE: 1
PSYCHIATRIC NEGATIVE: 1
LIGHT-HEADEDNESS: 0
GASTROINTESTINAL NEGATIVE: 1
CARDIOVASCULAR NEGATIVE: 1

## 2025-02-05 NOTE — PROGRESS NOTES
[facility]: [facility]  MEDICARE WELLNESS EXAM      Michael Schwarz is a 69 y.o. male that is presenting today for Annual Exam (Pt is here for physical, / nr ).    Concerns:    Subjective   ASHD- Cardiology- courtney.  Follow up April lipids recently    Exercixe 5 days per week,  HIIT workouts with strength combined.     Gout- controlled.                      Review of Systems   Constitutional: Negative.    HENT: Negative.     Respiratory: Negative.     Cardiovascular: Negative.    Gastrointestinal: Negative.    Genitourinary: Negative.    Musculoskeletal:  Positive for arthralgias and myalgias.   Skin: Negative.    Neurological:  Negative for weakness and light-headedness.   Psychiatric/Behavioral: Negative.         Other Providers:cardiology -Courtney,       Hearing Changes: no    Cognitive Assessment: mini-cog    Depression Screening: negative     ACTIVITIES OF DAILY LIVING:  Basic ADLs:  Problems with Bathing, Dressing, Toileting, Transferring, Continence, Feeding No  Instrumental ADLs:  No problems with Ability to use phone, Shopping, Cooking, House-keeping, Laundry, Transportation, Medication Management, Finance Management No    Advanced Care Planning was discussed with patient:  The patient does not have an advanced care plan on file. The patient does not have an active surrogate decision-maker on file.    History    Past Medical History:   Diagnosis Date    Heart disease     Hypertension     Myocardial infarction (Multi) on May 14, 2022     Past Surgical History:   Procedure Laterality Date    BYPASS GRAFT  05/19/2022    CORONARY ARTERY BYPASS GRAFT  May 19, 2022     Family History   Problem Relation Name Age of Onset    Stroke Mother Cary Schwarz     Diabetes Father Andres THOMASAgustina Schwarz     Heart disease Father Andres THOMASAgustina Schwarz     Arthritis Sister Carlie Kathleen     No Known Problems Sister      Other (mini stroke) Brother Dayton Karishma     Stroke Brother Dayton Kwanos     No Known Problems Daughter       No Known Problems Son      No Known Problems Son       No Known Allergies  Current Outpatient Medications on File Prior to Visit   Medication Sig Dispense Refill    allopurinol (Zyloprim) 100 mg tablet TAKE 1 TABLET BY MOUTH EVERY DAY 90 tablet 4    atorvastatin (Lipitor) 40 mg tablet TAKE 1 TABLET BY MOUTH EVERY DAY FOR 90 DAYS 90 tablet 3    cholecalciferol (Vitamin D-3) 25 MCG (1000 UT) capsule 1 capsule Orally Once a day      clopidogrel (Plavix) 75 mg tablet TAKE 1 TABLET BY MOUTH EVERY DAY 90 tablet 3    ezetimibe (Zetia) 10 mg tablet TAKE 1 TABLET BY MOUTH EVERY DAY 90 tablet 3    metoprolol tartrate (Lopressor) 25 mg tablet TAKE 1 TABLET BY MOUTH TWICE A DAY HOLD FOR HR UNDER 60 OR BP SYST UNDER 100 180 tablet 2    multivitamin with minerals tablet Take 1 tablet by mouth once daily.       No current facility-administered medications on file prior to visit.     Immunization History   Administered Date(s) Administered    COVID-19, mRNA, LNP-S, PF, 30 mcg/0.3 mL dose 02/26/2021, 03/26/2021, 11/12/2021    Flu vaccine (IIV4), preservative free *Check age/dose* 09/15/2020    Flu vaccine, quadrivalent, high-dose, preservative free, age 65y+ (FLUZONE) 09/15/2021, 10/02/2023    Hepatitis B vaccine, 19 yrs and under (RECOMBIVAX, ENGERIX) 10/05/2018, 11/05/2018    Hepatitis B vaccine, adult *Check Product/Dose* 04/05/2019    Influenza, Seasonal, Quadrivalent, Adjuvanted 01/11/2023    Pfizer COVID-19 vaccine, 12 years and older, (30mcg/0.3mL) (Comirnaty) 10/02/2023, 10/24/2024    Pfizer COVID-19 vaccine, bivalent, age 12 years and older (30 mcg/0.3 mL) 01/12/2023    Pfizer Gray Cap SARS-CoV-2 06/15/2022    Pneumococcal conjugate vaccine, 13-valent (PREVNAR 13) 05/13/2021    Tdap vaccine, age 7 year and older (BOOSTRIX, ADACEL) 04/15/2016    Zoster vaccine, recombinant, adult (SHINGRIX) 10/27/2020    Zoster, live 04/15/2016     Patient's medical history was reviewed and updated either before or during this  encounter.    Objective   Vitals:    02/05/25 0911   BP: 122/70   Pulse: 71   SpO2: 98%      Physical Exam  Constitutional:       General: He is not in acute distress.     Appearance: Normal appearance. He is not ill-appearing.   HENT:      Nose: Nose normal.      Mouth/Throat:      Pharynx: Oropharynx is clear. No posterior oropharyngeal erythema.   Neck:      Thyroid: No thyroid mass or thyroid tenderness.      Vascular: No carotid bruit.   Cardiovascular:      Rate and Rhythm: Normal rate and regular rhythm.      Pulses:           Radial pulses are 2+ on the right side and 2+ on the left side.        Dorsalis pedis pulses are 2+ on the right side and 2+ on the left side.      Heart sounds: Normal heart sounds. No murmur heard.     No friction rub. No gallop.   Pulmonary:      Effort: Pulmonary effort is normal.      Breath sounds: Normal breath sounds.   Abdominal:      General: Bowel sounds are normal.      Palpations: Abdomen is soft. There is no hepatomegaly or splenomegaly.      Tenderness: There is no abdominal tenderness.   Musculoskeletal:      Cervical back: Normal range of motion. No tenderness.      Right lower leg: No edema.      Left lower leg: No edema.      Comments: No gross abnormalities    Lymphadenopathy:      Cervical: No cervical adenopathy.      Upper Body:      Right upper body: No supraclavicular adenopathy.      Left upper body: No supraclavicular adenopathy.   Skin:     General: Skin is warm.      Capillary Refill: Capillary refill takes 2 to 3 seconds.   Neurological:      General: No focal deficit present.      Mental Status: He is alert.      Cranial Nerves: Cranial nerves 2-12 are intact.      Coordination: Coordination is intact.      Gait: Gait is intact.      Comments: No obvious neurological deficits    Psychiatric:         Mood and Affect: Mood and affect normal.       Mobility Assessment: get up and go test <30 seconds- Yes.       Assessment/Plan    Diagnoses and all orders for  this visit:  Routine general medical examination at health care facility  -     1 Year Follow Up In Primary Care - Wellness Exam; Future  Screening PSA (prostate specific antigen)  -     Prostate Specific Antigen, Screen; Future  ASHD (arteriosclerotic heart disease)  -     Comprehensive Metabolic Panel; Future  -     CBC and Auto Differential; Future  Encounter for screening for malignant neoplasm of colon  -     Colonoscopy Screening; Average Risk Patient; Future  Idiopathic chronic gout without tophus, unspecified site  -     Uric acid; Future    Patient Active Problem List   Diagnosis    History of coronary artery bypass graft    Coronary artery disease    Idiopathic chronic gout without tophus    Mixed hyperlipidemia    Nephrolithiasis    PVD (posterior vitreous detachment), right eye    Anxiety    Cataracts, both eyes     Advance Care Planning   Diagnoses and all orders for this visit:  Routine general medical examination at health care facility  -     1 Year Follow Up In Primary Care - Wellness Exam; Future  Screening PSA (prostate specific antigen)  -     Prostate Specific Antigen, Screen; Future  ASHD (arteriosclerotic heart disease)  -     Comprehensive Metabolic Panel; Future  -     CBC and Auto Differential; Future  Encounter for screening for malignant neoplasm of colon  -     Colonoscopy Screening; Average Risk Patient; Future  Idiopathic chronic gout without tophus, unspecified site  -     Uric acid; Future  Capvaxive pna vaccine.      The patient was encouraged to ensure that any/all documentation is accurate and up to date, and that our office be provided a copy in the event that anything changes.    Orlin Puentes MD

## 2025-02-11 DIAGNOSIS — Z12.11 COLON CANCER SCREENING: Primary | ICD-10-CM

## 2025-02-11 RX ORDER — SODIUM, POTASSIUM,MAG SULFATES 17.5-3.13G
0.51 SOLUTION, RECONSTITUTED, ORAL ORAL SEE ADMIN INSTRUCTIONS
Qty: 354 ML | Refills: 0 | Status: SHIPPED | OUTPATIENT
Start: 2025-02-11

## 2025-03-08 DIAGNOSIS — I25.10 CORONARY ARTERY DISEASE INVOLVING NATIVE CORONARY ARTERY OF NATIVE HEART, UNSPECIFIED WHETHER ANGINA PRESENT: ICD-10-CM

## 2025-03-10 NOTE — TELEPHONE ENCOUNTER
Called pt to make appt since we have not seen them in over a year and received a refill request, no answer and could not leave message

## 2025-03-11 ENCOUNTER — TELEPHONE (OUTPATIENT)
Facility: CLINIC | Age: 70
End: 2025-03-11
Payer: MEDICARE

## 2025-03-11 RX ORDER — METOPROLOL TARTRATE 25 MG/1
TABLET, FILM COATED ORAL
Qty: 180 TABLET | Refills: 2 | OUTPATIENT
Start: 2025-03-11

## 2025-03-26 ENCOUNTER — OFFICE VISIT (OUTPATIENT)
Dept: PRIMARY CARE | Facility: CLINIC | Age: 70
End: 2025-03-26
Payer: MEDICARE

## 2025-03-26 VITALS — HEART RATE: 74 BPM | OXYGEN SATURATION: 97 % | DIASTOLIC BLOOD PRESSURE: 78 MMHG | SYSTOLIC BLOOD PRESSURE: 132 MMHG

## 2025-03-26 DIAGNOSIS — Z12.5 SCREENING FOR MALIGNANT NEOPLASM OF PROSTATE: ICD-10-CM

## 2025-03-26 DIAGNOSIS — E79.0 HYPERURICEMIA: ICD-10-CM

## 2025-03-26 DIAGNOSIS — Z13.6 ENCOUNTER FOR SCREENING FOR CARDIOVASCULAR DISORDERS: ICD-10-CM

## 2025-03-26 DIAGNOSIS — M1A.00X0 IDIOPATHIC CHRONIC GOUT WITHOUT TOPHUS, UNSPECIFIED SITE: ICD-10-CM

## 2025-03-26 DIAGNOSIS — Z13.29 SCREENING FOR THYROID DISORDER: ICD-10-CM

## 2025-03-26 DIAGNOSIS — Z12.83 SKIN EXAM FOR MALIGNANT NEOPLASM: ICD-10-CM

## 2025-03-26 DIAGNOSIS — E55.9 VITAMIN D DEFICIENCY: Primary | ICD-10-CM

## 2025-03-26 DIAGNOSIS — I25.10 CORONARY ARTERY DISEASE INVOLVING NATIVE CORONARY ARTERY OF NATIVE HEART, UNSPECIFIED WHETHER ANGINA PRESENT: ICD-10-CM

## 2025-03-26 DIAGNOSIS — Z13.1 SCREENING FOR DIABETES MELLITUS: ICD-10-CM

## 2025-03-26 DIAGNOSIS — Z11.59 SCREENING FOR VIRAL DISEASE: ICD-10-CM

## 2025-03-26 DIAGNOSIS — R79.9 ABNORMAL FINDING OF BLOOD CHEMISTRY, UNSPECIFIED: ICD-10-CM

## 2025-03-26 DIAGNOSIS — E78.2 MIXED HYPERLIPIDEMIA: ICD-10-CM

## 2025-03-26 DIAGNOSIS — M75.81 TENDINITIS OF RIGHT ROTATOR CUFF: ICD-10-CM

## 2025-03-26 DIAGNOSIS — Z95.1 HX OF CABG: ICD-10-CM

## 2025-03-26 PROBLEM — I21.4 ACUTE NON-ST ELEVATION MYOCARDIAL INFARCTION (NSTEMI) (MULTI): Status: ACTIVE | Noted: 2022-05-14

## 2025-03-26 PROBLEM — Z20.822 CONTACT WITH AND (SUSPECTED) EXPOSURE TO COVID-19: Status: ACTIVE | Noted: 2022-05-14

## 2025-03-26 PROCEDURE — 99214 OFFICE O/P EST MOD 30 MIN: CPT | Performed by: FAMILY MEDICINE

## 2025-03-26 PROCEDURE — G2211 COMPLEX E/M VISIT ADD ON: HCPCS | Performed by: FAMILY MEDICINE

## 2025-03-26 PROCEDURE — 1126F AMNT PAIN NOTED NONE PRSNT: CPT | Performed by: FAMILY MEDICINE

## 2025-03-26 PROCEDURE — 1124F ACP DISCUSS-NO DSCNMKR DOCD: CPT | Performed by: FAMILY MEDICINE

## 2025-03-26 RX ORDER — METOPROLOL TARTRATE 25 MG/1
25 TABLET, FILM COATED ORAL 2 TIMES DAILY
Qty: 180 TABLET | Refills: 3 | Status: SHIPPED | OUTPATIENT
Start: 2025-03-26 | End: 2026-03-26

## 2025-03-26 RX ORDER — EZETIMIBE 10 MG/1
10 TABLET ORAL DAILY
Qty: 90 TABLET | Refills: 0 | Status: SHIPPED | OUTPATIENT
Start: 2025-03-26 | End: 2025-06-24

## 2025-03-26 RX ORDER — ALLOPURINOL 100 MG/1
100 TABLET ORAL DAILY
Qty: 90 TABLET | Refills: 4 | Status: SHIPPED | OUTPATIENT
Start: 2025-03-26

## 2025-03-26 RX ORDER — CLOPIDOGREL BISULFATE 75 MG/1
75 TABLET ORAL DAILY
Qty: 90 TABLET | Refills: 0 | Status: SHIPPED | OUTPATIENT
Start: 2025-03-26 | End: 2025-06-24

## 2025-03-26 RX ORDER — ATORVASTATIN CALCIUM 40 MG/1
40 TABLET, FILM COATED ORAL DAILY
Qty: 90 TABLET | Refills: 0 | Status: SHIPPED | OUTPATIENT
Start: 2025-03-26 | End: 2025-06-24

## 2025-03-26 RX ORDER — NAPROXEN 500 MG/1
500 TABLET ORAL 2 TIMES DAILY PRN
Qty: 60 TABLET | Refills: 0 | Status: SHIPPED | OUTPATIENT
Start: 2025-03-26 | End: 2025-06-24

## 2025-03-26 ASSESSMENT — ENCOUNTER SYMPTOMS
LOSS OF SENSATION IN FEET: 0
OCCASIONAL FEELINGS OF UNSTEADINESS: 0
DEPRESSION: 0

## 2025-03-26 ASSESSMENT — COLUMBIA-SUICIDE SEVERITY RATING SCALE - C-SSRS
1. IN THE PAST MONTH, HAVE YOU WISHED YOU WERE DEAD OR WISHED YOU COULD GO TO SLEEP AND NOT WAKE UP?: NO
2. HAVE YOU ACTUALLY HAD ANY THOUGHTS OF KILLING YOURSELF?: NO
6. HAVE YOU EVER DONE ANYTHING, STARTED TO DO ANYTHING, OR PREPARED TO DO ANYTHING TO END YOUR LIFE?: NO

## 2025-03-26 ASSESSMENT — PAIN SCALES - GENERAL: PAINLEVEL_OUTOF10: 0-NO PAIN

## 2025-03-26 NOTE — PROGRESS NOTES
69-year presents to clinic to establish care and for follow chronic medical conditions    1. Vitamin D deficiency    2. Abnormal finding of blood chemistry, unspecified    3. Screening for thyroid disorder    4. Screening for diabetes mellitus    5. Screening for viral disease    6. Encounter for screening for cardiovascular disorders    7. Screening for malignant neoplasm of prostate    8. Mixed hyperlipidemia   Believes he may be on only 20 mg of atorvastatin at this time: Most recent LDL was greater than 70 goal less than 70.  Believes he was reduced on dose secondary to mildly elevated liver enzymes in the past but is unsure   9. Idiopathic chronic gout without tophus, unspecified site   Currently on allopurinol needs uric acid level rechecked no recent gout flare   10. Hyperuricemia            13. Skin exam for malignant neoplasm    14. Tendinitis of right rotator cuff   Has been experiencing shoulder pain in the right shoulder for some time at least greater than the last month.  Does exercise fairly regularly.  No recent trauma or injury         All pertinent positive symptoms are included in history of present illness.    All other systems have been reviewed and are negative and noncontributory to this patient's current ailments.    CONSTITUTIONAL - INAD. Not ill appearing.  SKIN - No lesions or rashes visualized. Good skin turgor.  HEENT- Head is atraumatic and normocephalic. Nasal turbinates are nonerythematous and without drainage. .  RESP - CTAB. No wheezing, rhonchi, or crackles.   CARDIAC - RRR. No murmurs, gallops, or rubs.  ABDOMEN - Soft, nontender, nondistended. No organomegaly.  NEURO - CNs 2-12 grossly intact.  PSYCH - Normal mood and affect  MUSCULOSKELETAL  Affected shoulder range of motion is  160° abduction, 160° flexion, external rotation to 70°, internal rotation to inferior border of the scapula .  Positive Swan, positive Neer's, negative Jobs, negative biceps insertion tenderness,  negative AC joint tenderness.    1. Vitamin D deficiency (Primary)    - CBC and Auto Differential; Future  - Comprehensive Metabolic Panel; Future  - Hemoglobin A1C; Future  - TSH with reflex to Free T4 if abnormal; Future  - Vitamin D 25-Hydroxy,Total (for eval of Vitamin D levels); Future  - Prostate Specific Antigen, Screen; Future  - Uric acid; Future  - CBC and Auto Differential  - Comprehensive Metabolic Panel  - Hemoglobin A1C  - TSH with reflex to Free T4 if abnormal  - Vitamin D 25-Hydroxy,Total (for eval of Vitamin D levels)  - Prostate Specific Antigen, Screen  - Uric acid    2. Abnormal finding of blood chemistry, unspecified    - CBC and Auto Differential; Future  - Comprehensive Metabolic Panel; Future  - Hemoglobin A1C; Future  - TSH with reflex to Free T4 if abnormal; Future  - Vitamin D 25-Hydroxy,Total (for eval of Vitamin D levels); Future  - Prostate Specific Antigen, Screen; Future  - Uric acid; Future  - CBC and Auto Differential  - Comprehensive Metabolic Panel  - Hemoglobin A1C  - TSH with reflex to Free T4 if abnormal  - Vitamin D 25-Hydroxy,Total (for eval of Vitamin D levels)  - Prostate Specific Antigen, Screen  - Uric acid    3. Screening for thyroid disorder    - CBC and Auto Differential; Future  - Comprehensive Metabolic Panel; Future  - Hemoglobin A1C; Future  - TSH with reflex to Free T4 if abnormal; Future  - Vitamin D 25-Hydroxy,Total (for eval of Vitamin D levels); Future  - Prostate Specific Antigen, Screen; Future  - Uric acid; Future  - CBC and Auto Differential  - Comprehensive Metabolic Panel  - Hemoglobin A1C  - TSH with reflex to Free T4 if abnormal  - Vitamin D 25-Hydroxy,Total (for eval of Vitamin D levels)  - Prostate Specific Antigen, Screen  - Uric acid    4. Screening for diabetes mellitus    - CBC and Auto Differential; Future  - Comprehensive Metabolic Panel; Future  - Hemoglobin A1C; Future  - TSH with reflex to Free T4 if abnormal; Future  - Vitamin D  25-Hydroxy,Total (for eval of Vitamin D levels); Future  - Prostate Specific Antigen, Screen; Future  - Uric acid; Future  - CBC and Auto Differential  - Comprehensive Metabolic Panel  - Hemoglobin A1C  - TSH with reflex to Free T4 if abnormal  - Vitamin D 25-Hydroxy,Total (for eval of Vitamin D levels)  - Prostate Specific Antigen, Screen  - Uric acid    5. Screening for viral disease    - CBC and Auto Differential; Future  - Comprehensive Metabolic Panel; Future  - Hemoglobin A1C; Future  - TSH with reflex to Free T4 if abnormal; Future  - Vitamin D 25-Hydroxy,Total (for eval of Vitamin D levels); Future  - Prostate Specific Antigen, Screen; Future  - Uric acid; Future  - CBC and Auto Differential  - Comprehensive Metabolic Panel  - Hemoglobin A1C  - TSH with reflex to Free T4 if abnormal  - Vitamin D 25-Hydroxy,Total (for eval of Vitamin D levels)  - Prostate Specific Antigen, Screen  - Uric acid    6. Encounter for screening for cardiovascular disorders    - CBC and Auto Differential; Future  - Comprehensive Metabolic Panel; Future  - Hemoglobin A1C; Future  - TSH with reflex to Free T4 if abnormal; Future  - Vitamin D 25-Hydroxy,Total (for eval of Vitamin D levels); Future  - Prostate Specific Antigen, Screen; Future  - Uric acid; Future  - CBC and Auto Differential  - Comprehensive Metabolic Panel  - Hemoglobin A1C  - TSH with reflex to Free T4 if abnormal  - Vitamin D 25-Hydroxy,Total (for eval of Vitamin D levels)  - Prostate Specific Antigen, Screen  - Uric acid    7. Screening for malignant neoplasm of prostate    - CBC and Auto Differential; Future  - Comprehensive Metabolic Panel; Future  - Hemoglobin A1C; Future  - TSH with reflex to Free T4 if abnormal; Future  - Vitamin D 25-Hydroxy,Total (for eval of Vitamin D levels); Future  - Prostate Specific Antigen, Screen; Future  - Uric acid; Future  - CBC and Auto Differential  - Comprehensive Metabolic Panel  - Hemoglobin A1C  - TSH with reflex to Free T4  if abnormal  - Vitamin D 25-Hydroxy,Total (for eval of Vitamin D levels)  - Prostate Specific Antigen, Screen  - Uric acid    8. Mixed hyperlipidemia  Discussed with patient about the natural history and course of hyperlipidemia.  Discussed lifestyle as well as genetic implications of hyperlipidemia.      Discussed possible treatment options of hyperlipidemia including intensive lifestyle interventions including lower carbohydrate, lower saturated fat diet as well as increasing aerobic and resistance training exercise to at least 30 minutes daily 3 times a week, hopefully more.    Discussed medication options including the use of statin medications as well as the side effects of these medications.  Discussed goal LDL of <100 for primary prevention and <70 for secondary prevention.    Discussed the cost benefit analysis of lowering cholesterol and how it will help prevent heart attacks and strokes in the future, also discussed the benefit of statin medications and the large amount of studies showing a reduction in morbidity mortality with the use of a statin medication in the setting of multiple risk factors for heart attacks and strokes.    Discussed secondary risk stratification with CT Cardiac Scoring and utilizing the PREVENT calculator to determine if statin use with actually benefit patients.     If medication was prescribed or continued, the appropriate lab work was ordered.    Patient not currently at goal LDL will trial increasing atorvastatin to 40 mg daily recheck liver enzymes at next visit  - CBC and Auto Differential; Future  - Comprehensive Metabolic Panel; Future  - Hemoglobin A1C; Future  - TSH with reflex to Free T4 if abnormal; Future  - Vitamin D 25-Hydroxy,Total (for eval of Vitamin D levels); Future  - Prostate Specific Antigen, Screen; Future  - Uric acid; Future  - ezetimibe (Zetia) 10 mg tablet; Take 1 tablet (10 mg) by mouth once daily.  Dispense: 90 tablet; Refill: 0  - CBC and Auto  Differential  - Comprehensive Metabolic Panel  - Hemoglobin A1C  - TSH with reflex to Free T4 if abnormal  - Vitamin D 25-Hydroxy,Total (for eval of Vitamin D levels)  - Prostate Specific Antigen, Screen  - Uric acid    9. Idiopathic chronic gout without tophus, unspecified site  Continue allopurinol  - CBC and Auto Differential; Future  - Comprehensive Metabolic Panel; Future  - Hemoglobin A1C; Future  - TSH with reflex to Free T4 if abnormal; Future  - Vitamin D 25-Hydroxy,Total (for eval of Vitamin D levels); Future  - Prostate Specific Antigen, Screen; Future  - Uric acid; Future  - CBC and Auto Differential  - Comprehensive Metabolic Panel  - Hemoglobin A1C  - TSH with reflex to Free T4 if abnormal  - Vitamin D 25-Hydroxy,Total (for eval of Vitamin D levels)  - Prostate Specific Antigen, Screen  - Uric acid    10. Hyperuricemia  Check uric acid continue allopurinol  - allopurinol (Zyloprim) 100 mg tablet; Take 1 tablet (100 mg) by mouth once daily.  Dispense: 90 tablet; Refill: 4    11. Hx of CABG  Continue Plavix, increase atorvastatin, currently at goal blood pressure not at goal LDL.  Continue following with cardiology  - clopidogrel (Plavix) 75 mg tablet; Take 1 tablet (75 mg) by mouth once daily.  Dispense: 90 tablet; Refill: 0  - atorvastatin (Lipitor) 40 mg tablet; Take 1 tablet (40 mg) by mouth once daily.  Dispense: 90 tablet; Refill: 0    12. Coronary artery disease involving native coronary artery of native heart, unspecified whether angina present  Continue with Toprol  - metoprolol tartrate (Lopressor) 25 mg tablet; Take 1 tablet (25 mg) by mouth 2 times a day.  Dispense: 180 tablet; Refill: 3    13. Skin exam for malignant neoplasm    - Referral to Dermatology    14. Tendinitis of right rotator cuff  We had a long discussion in regards to your shoulder pain.  We discussed the differential diagnosis of shoulder impingement, rotator cuff tendinopathy, rotator cuff tearing, and biceps tendinopathy as  all being potential sources of the pain.  We discussed the non-operative and operative treatment options for each of these conditions with you.     We will start off by treating your shoulder conservatively (AKA non-operatively).  We gave you a prescription for physical therapy to work on increasing the range of motion and strength in the shoulder.     X-rays of the shoulder were ordered/reviewed today to look for any occult fractures or other pathology that may be causing the pain.    Shoulder injection was also discussed today.    We will see you back in 4-6 weeks to reevaluate your shoulder pain. If you are still having pain at that time, we would then need to order an MRI to look for possible rotator cuff tears or biceps tearing in the shoulder, as well as provide a referral to orthopedic surgery. We will see you back in 4-6 weeks, or sooner if necessary. Please call with any questions or problems.    - naproxen (Naprosyn) 500 mg tablet; Take 1 tablet (500 mg) by mouth 2 times a day as needed for mild pain (1 - 3) (pain).  Dispense: 60 tablet; Refill: 0  - XR shoulder right 2+ views; Future

## 2025-04-01 ENCOUNTER — TELEPHONE (OUTPATIENT)
Dept: PRIMARY CARE | Facility: CLINIC | Age: 70
End: 2025-04-01
Payer: MEDICARE

## 2025-04-01 NOTE — TELEPHONE ENCOUNTER
Pt called stating he got a message through [facility] stating he needs shingles and pneumonia vaccine and pt went to pharmacy where he gets his vaccines and the pharmacy told him he does not. Pt would like to know from pcp if he needs those two vaccines. Please advise.

## 2025-04-01 NOTE — TELEPHONE ENCOUNTER
If pharmacy has updated records, we would just need to obtain his immunization records from the pharmacy so we can update our own to accurately reflect his getting his age and status

## 2025-04-03 ENCOUNTER — TELEPHONE (OUTPATIENT)
Dept: PRIMARY CARE | Facility: CLINIC | Age: 70
End: 2025-04-03

## 2025-04-03 LAB
25(OH)D3+25(OH)D2 SERPL-MCNC: 34 NG/ML (ref 30–100)
ALBUMIN SERPL-MCNC: 4.1 G/DL (ref 3.6–5.1)
ALP SERPL-CCNC: 59 U/L (ref 35–144)
ALT SERPL-CCNC: 20 U/L (ref 9–46)
ANION GAP SERPL CALCULATED.4IONS-SCNC: 9 MMOL/L (CALC) (ref 7–17)
AST SERPL-CCNC: 21 U/L (ref 10–35)
BASOPHILS # BLD AUTO: 20 CELLS/UL (ref 0–200)
BASOPHILS NFR BLD AUTO: 0.3 %
BILIRUB SERPL-MCNC: 0.6 MG/DL (ref 0.2–1.2)
BUN SERPL-MCNC: 26 MG/DL (ref 7–25)
CALCIUM SERPL-MCNC: 9.4 MG/DL (ref 8.6–10.3)
CHLORIDE SERPL-SCNC: 106 MMOL/L (ref 98–110)
CO2 SERPL-SCNC: 29 MMOL/L (ref 20–32)
CREAT SERPL-MCNC: 0.97 MG/DL (ref 0.7–1.35)
EGFRCR SERPLBLD CKD-EPI 2021: 85 ML/MIN/1.73M2
EOSINOPHIL # BLD AUTO: 80 CELLS/UL (ref 15–500)
EOSINOPHIL NFR BLD AUTO: 1.2 %
ERYTHROCYTE [DISTWIDTH] IN BLOOD BY AUTOMATED COUNT: 12.8 % (ref 11–15)
EST. AVERAGE GLUCOSE BLD GHB EST-MCNC: 108 MG/DL
EST. AVERAGE GLUCOSE BLD GHB EST-SCNC: 6 MMOL/L
GLUCOSE SERPL-MCNC: 79 MG/DL (ref 65–99)
HBA1C MFR BLD: 5.4 % OF TOTAL HGB
HCT VFR BLD AUTO: 39.3 % (ref 38.5–50)
HGB BLD-MCNC: 13.4 G/DL (ref 13.2–17.1)
LYMPHOCYTES # BLD AUTO: 1300 CELLS/UL (ref 850–3900)
LYMPHOCYTES NFR BLD AUTO: 19.4 %
MCH RBC QN AUTO: 31.5 PG (ref 27–33)
MCHC RBC AUTO-ENTMCNC: 34.1 G/DL (ref 32–36)
MCV RBC AUTO: 92.5 FL (ref 80–100)
MONOCYTES # BLD AUTO: 610 CELLS/UL (ref 200–950)
MONOCYTES NFR BLD AUTO: 9.1 %
NEUTROPHILS # BLD AUTO: 4690 CELLS/UL (ref 1500–7800)
NEUTROPHILS NFR BLD AUTO: 70 %
PLATELET # BLD AUTO: 211 THOUSAND/UL (ref 140–400)
PMV BLD REES-ECKER: 10.4 FL (ref 7.5–12.5)
POTASSIUM SERPL-SCNC: 4.7 MMOL/L (ref 3.5–5.3)
PROT SERPL-MCNC: 7 G/DL (ref 6.1–8.1)
PSA SERPL-MCNC: 5.46 NG/ML
RBC # BLD AUTO: 4.25 MILLION/UL (ref 4.2–5.8)
SODIUM SERPL-SCNC: 144 MMOL/L (ref 135–146)
TSH SERPL-ACNC: 0.53 MIU/L (ref 0.4–4.5)
URATE SERPL-MCNC: 4.9 MG/DL (ref 4–8)
WBC # BLD AUTO: 6.7 THOUSAND/UL (ref 3.8–10.8)

## 2025-04-03 NOTE — TELEPHONE ENCOUNTER
Patient called and stated he is having a colonoscopy 04/10/25.  His GI doctor advised him to call and see if he should adjust or stop his Plavix prior to the procedure.  Please advise.  Thank you.

## 2025-04-03 NOTE — TELEPHONE ENCOUNTER
Typically of greater than 12 months from stent placement, recommendation is to stop Plavix 7 days before procedure and resume 1 to 2 days after procedure.  I would advise patient reach out to his primary cardiologist for recommendations however.

## 2025-04-10 ENCOUNTER — ANESTHESIA EVENT (OUTPATIENT)
Dept: GASTROENTEROLOGY | Facility: HOSPITAL | Age: 70
End: 2025-04-10
Payer: MEDICARE

## 2025-04-10 ENCOUNTER — HOSPITAL ENCOUNTER (OUTPATIENT)
Dept: GASTROENTEROLOGY | Facility: HOSPITAL | Age: 70
Discharge: HOME | End: 2025-04-10
Payer: MEDICARE

## 2025-04-10 ENCOUNTER — ANESTHESIA (OUTPATIENT)
Dept: GASTROENTEROLOGY | Facility: HOSPITAL | Age: 70
End: 2025-04-10
Payer: MEDICARE

## 2025-04-10 VITALS
OXYGEN SATURATION: 98 % | WEIGHT: 207.45 LBS | RESPIRATION RATE: 17 BRPM | SYSTOLIC BLOOD PRESSURE: 124 MMHG | TEMPERATURE: 97.2 F | BODY MASS INDEX: 30.73 KG/M2 | HEIGHT: 69 IN | DIASTOLIC BLOOD PRESSURE: 71 MMHG | HEART RATE: 54 BPM

## 2025-04-10 DIAGNOSIS — Z12.11 ENCOUNTER FOR SCREENING FOR MALIGNANT NEOPLASM OF COLON: ICD-10-CM

## 2025-04-10 DIAGNOSIS — Z86.0101 PERSONAL HISTORY OF ADENOMATOUS AND SERRATED COLON POLYPS: Primary | ICD-10-CM

## 2025-04-10 PROCEDURE — 3700000001 HC GENERAL ANESTHESIA TIME - INITIAL BASE CHARGE

## 2025-04-10 PROCEDURE — 7100000010 HC PHASE TWO TIME - EACH INCREMENTAL 1 MINUTE

## 2025-04-10 PROCEDURE — G0105 COLORECTAL SCRN; HI RISK IND: HCPCS | Performed by: INTERNAL MEDICINE

## 2025-04-10 PROCEDURE — 45378 DIAGNOSTIC COLONOSCOPY: CPT | Performed by: INTERNAL MEDICINE

## 2025-04-10 PROCEDURE — 7100000009 HC PHASE TWO TIME - INITIAL BASE CHARGE

## 2025-04-10 PROCEDURE — 3700000002 HC GENERAL ANESTHESIA TIME - EACH INCREMENTAL 1 MINUTE

## 2025-04-10 PROCEDURE — 2500000004 HC RX 250 GENERAL PHARMACY W/ HCPCS (ALT 636 FOR OP/ED)

## 2025-04-10 RX ORDER — LIDOCAINE HYDROCHLORIDE 20 MG/ML
INJECTION, SOLUTION EPIDURAL; INFILTRATION; INTRACAUDAL; PERINEURAL AS NEEDED
Status: DISCONTINUED | OUTPATIENT
Start: 2025-04-10 | End: 2025-04-10

## 2025-04-10 RX ORDER — PROPOFOL 10 MG/ML
INJECTION, EMULSION INTRAVENOUS CONTINUOUS PRN
Status: DISCONTINUED | OUTPATIENT
Start: 2025-04-10 | End: 2025-04-10

## 2025-04-10 RX ORDER — PHENYLEPHRINE HCL IN 0.9% NACL 1 MG/10 ML
SYRINGE (ML) INTRAVENOUS AS NEEDED
Status: DISCONTINUED | OUTPATIENT
Start: 2025-04-10 | End: 2025-04-10

## 2025-04-10 RX ORDER — SODIUM CHLORIDE 0.9 % (FLUSH) 0.9 %
SYRINGE (ML) INJECTION AS NEEDED
Status: DISCONTINUED | OUTPATIENT
Start: 2025-04-10 | End: 2025-04-10

## 2025-04-10 RX ADMIN — PROPOFOL 30 MG: 10 INJECTION, EMULSION INTRAVENOUS at 11:04

## 2025-04-10 RX ADMIN — PROPOFOL 150 MCG/KG/MIN: 10 INJECTION, EMULSION INTRAVENOUS at 11:03

## 2025-04-10 RX ADMIN — Medication 10 ML: at 11:58

## 2025-04-10 RX ADMIN — Medication 50 MCG: at 11:29

## 2025-04-10 RX ADMIN — Medication 50 MCG: at 11:21

## 2025-04-10 RX ADMIN — PROPOFOL 20 MG: 10 INJECTION, EMULSION INTRAVENOUS at 11:08

## 2025-04-10 RX ADMIN — Medication 150 MCG: at 11:33

## 2025-04-10 RX ADMIN — PROPOFOL 30 MG: 10 INJECTION, EMULSION INTRAVENOUS at 11:06

## 2025-04-10 RX ADMIN — LIDOCAINE HYDROCHLORIDE 50 MG: 20 INJECTION, SOLUTION EPIDURAL; INFILTRATION; INTRACAUDAL; PERINEURAL at 10:56

## 2025-04-10 RX ADMIN — Medication 50 MCG: at 11:20

## 2025-04-10 RX ADMIN — Medication 100 MCG: at 11:38

## 2025-04-10 RX ADMIN — GLYCOPYRROLATE 0.2 MG: 0.2 INJECTION, SOLUTION INTRAMUSCULAR; INTRAVENOUS at 11:26

## 2025-04-10 ASSESSMENT — PAIN SCALES - GENERAL
PAINLEVEL_OUTOF10: 0 - NO PAIN

## 2025-04-10 ASSESSMENT — COLUMBIA-SUICIDE SEVERITY RATING SCALE - C-SSRS
6. HAVE YOU EVER DONE ANYTHING, STARTED TO DO ANYTHING, OR PREPARED TO DO ANYTHING TO END YOUR LIFE?: NO
1. IN THE PAST MONTH, HAVE YOU WISHED YOU WERE DEAD OR WISHED YOU COULD GO TO SLEEP AND NOT WAKE UP?: NO
2. HAVE YOU ACTUALLY HAD ANY THOUGHTS OF KILLING YOURSELF?: NO

## 2025-04-10 ASSESSMENT — PAIN - FUNCTIONAL ASSESSMENT
PAIN_FUNCTIONAL_ASSESSMENT: 0-10

## 2025-04-10 NOTE — DISCHARGE INSTRUCTIONS

## 2025-04-10 NOTE — ANESTHESIA PREPROCEDURE EVALUATION
Patient: Michael Schwarz    Procedure Information       Date/Time: 04/10/25 1010    Scheduled providers: Andres Muir DO; Julian Bazan MD    Procedure: COLONOSCOPY    Location: [facility]            Relevant Problems   Cardiac   (+) Acute non-ST elevation myocardial infarction (NSTEMI) (Multi)   (+) Coronary artery disease   (+) History of coronary artery bypass graft   (+) Mixed hyperlipidemia      Neuro   (+) Anxiety      /Renal   (+) Nephrolithiasis       Clinical information reviewed:   Tobacco  Allergies  Meds   Med Hx  Surg Hx   Fam Hx  Soc Hx         Past Medical History:   Diagnosis Date    CAD (coronary artery disease)     Gout     Hyperlipidemia     Hypertension     Myocardial infarction (Multi) on May 14, 2022      Past Surgical History:   Procedure Laterality Date    COLONOSCOPY      CORONARY ARTERY BYPASS GRAFT  05/19/2022    LIMA to the LAD LEO to RCA and SVG to diagonal with a jump to OM1    CYSTOSCOPY       Social History     Tobacco Use    Smoking status: Never     Passive exposure: Never    Smokeless tobacco: Never   Vaping Use    Vaping status: Never Used   Substance Use Topics    Alcohol use: Yes     Comment: 2 beers month    Drug use: Never      Current Outpatient Medications   Medication Instructions    allopurinol (ZYLOPRIM) 100 mg, oral, Daily    atorvastatin (LIPITOR) 40 mg, oral, Daily    cholecalciferol (Vitamin D-3) 25 MCG (1000 UT) capsule 1 capsule Orally Once a day    clopidogrel (PLAVIX) 75 mg, oral, Daily    ezetimibe (ZETIA) 10 mg, oral, Daily    metoprolol tartrate (LOPRESSOR) 25 mg, oral, 2 times daily    naproxen (NAPROSYN) 500 mg, oral, 2 times daily PRN    sodium,potassium,mag sulfates (Suprep) 17.5-3.13-1.6 gram solution 1 bottle, oral, See admin instructions      No Known Allergies     Chemistry    Lab Results   Component Value Date/Time     04/02/2025 1449    K 4.7 04/02/2025 1449     04/02/2025 1449    CO2 29 04/02/2025 1449    BUN 26 (H)  "04/02/2025 1449    CREATININE 0.97 04/02/2025 1449    Lab Results   Component Value Date/Time    CALCIUM 9.4 04/02/2025 1449    ALKPHOS 59 04/02/2025 1449    AST 21 04/02/2025 1449    ALT 20 04/02/2025 1449    BILITOT 0.6 04/02/2025 1449          Lab Results   Component Value Date    HGBA1C 5.4 04/02/2025     Lab Results   Component Value Date/Time    WBC 6.7 04/02/2025 1449    HGB 13.4 04/02/2025 1449    HCT 39.3 04/02/2025 1449     04/02/2025 1449     Lab Results   Component Value Date/Time    PROTIME 11.8 05/19/2022 1316    INR 1.1 05/19/2022 1316     No results found for: \"ABORH\"  No results found for this or any previous visit (from the past 4464 hours).  No results found for this or any previous visit from the past 1095 days.    Echo 5/31/2024@[facility]:   CONCLUSIONS:   - Exam indication: Hx CABG   - The left ventricle is normal in size. Left ventricular systolic function is   normal. EF = 57 ± 5% (2D biplane)   - The right ventricle is normal in size. Right ventricular systolic function is   normal.   - Estimated right ventricular systolic pressure is 34 mmHg consistent with normal   pulmonary artery pressures. Estimated right atrial pressure is 3 mmHg based on IVC    assessment.   - Normal atrial chamber dimensions.   - Structurally normal valves.   - Trivial MR.   - Trivial to mild TR.     LEFT VENTRICLE   The left ventricle is normal in size.   Left ventricular systolic function is normal. Global LV myocardial strain is   normal.   Normal left ventricular diastolic function.   Mitral annular lateral E/e': 3.3. Mitral annular septal E/e': 5.9.   Wall Motion:   All scored segments are normal.     RIGHT VENTRICLE   The right ventricle is normal in size.   Right ventricular systolic function is normal. RV systolic tissue Doppler velocity    is 11.0 cm/s. Tricuspid annular displacement is 1.1 cm.   Estimated right ventricular systolic pressure is 34 mmHg consistent with normal   pulmonary artery pressures. " "Estimated right atrial pressure is 3 mmHg based on IVC    assessment.     LEFT ATRIUM   The left atrial cavity is normal in size.   Pulmonary Veins:   The pulmonary venous pattern showed normal systolic flow.     RIGHT ATRIUM   The right atrial cavity is normal in size.   Inferior Vena Cava:   The inferior vena cava appears normal measuring 1.6 cm. The vessel decreases   greater than 50 percent with inspiration.     MITRAL VALVE   The mitral valve leaflets are structurally normal. There is trace mitral valve   regurgitation. The pressure half time is 87 msec. The peak mitral E/A ratio is   1.12. The average mitral E/e' ratio is 4.6. The mitral flow deceleration time is   300 msec.     TRICUSPID VALVE   The tricuspid valve leaflets are structurally normal. There is trace (trace - 1+)   tricuspid valve regurgitation. The hepatic venous pattern showed normal systolic   flow.     AORTIC VALVE   There is no aortic valve regurgitation. Tricuspid aortic valve. There is mild   thickening. The peak gradient is 6 mmHg (peak velocity = 119.4 cm/s).     PULMONIC VALVE   There is trace (trace - 1+) pulmonic valve regurgitation. There is no thickening.     AORTA   The visualized aorta is normal in size.   Measurements - Mid ascending aorta 3.5 cm.     PULMONARY ARTERIES   The pulmonary arteries are normal.     INTERATRIAL SEPTUM   There is no evidence of intracardiac shunting as detected by Doppler.     INTERVENTRICULAR SEPTUM   There is no flow through the interventricular septum as detected by Doppler.     PERICARDIUM   There is no pericardial effusion.     Visit Vitals  /61   Pulse 53   Temp 36.2 °C (97.2 °F) (Temporal)   Resp 16   Ht 1.753 m (5' 9\")   Wt 94.1 kg (207 lb 7.3 oz)   SpO2 95%   BMI 30.64 kg/m²   Smoking Status Never   BSA 2.14 m²     NPO/Void Status  Carbohydrate Drink Given Prior to Surgery? : N  Date of Last Liquid: 04/10/25  Time of Last Liquid: 0400  Date of Last Solid: 04/09/25  Time of Last Solid: " 0930  Last Intake Type: Clear fluids  Time of Last Void: 0830        Physical Exam    Airway  Mallampati: III  TM distance: >3 FB  Neck ROM: full     Cardiovascular - normal exam  Rhythm: regular  Rate: normal     Dental    Pulmonary - normal exam     Abdominal - normal exam             Anesthesia Plan    History of general anesthesia?: yes  History of complications of general anesthesia?: no    ASA 3     MAC   (Standard ASA monitoring.)  intravenous induction   Anesthetic plan and risks discussed with patient.    Plan discussed with CRNA and CAA.

## 2025-04-10 NOTE — ANESTHESIA POSTPROCEDURE EVALUATION
Patient: Michael Schwarz    Procedure Summary       Date: 04/10/25 Room / Location: [facility]    Anesthesia Start: 1052 Anesthesia Stop: 1205    Procedure: COLONOSCOPY Diagnosis:       Encounter for screening for malignant neoplasm of colon      Personal history of adenomatous and serrated colon polyps    Scheduled Providers: Andres Muir DO; Julian Bazan MD; MORENO Elkins Responsible Provider: Julian Bazan MD    Anesthesia Type: MAC ASA Status: 3            Anesthesia Type: MAC    Vitals Value Taken Time   /71 04/10/25 1229   Temp 36.2 °C (97.2 °F) 04/10/25 1159   Pulse 54 04/10/25 1229   Resp 17 04/10/25 1229   SpO2 98 % 04/10/25 1229       Anesthesia Post Evaluation    Patient location during evaluation: PACU  Patient participation: complete - patient participated  Level of consciousness: awake and alert  Pain management: adequate  Airway patency: patent  Cardiovascular status: acceptable and hemodynamically stable  Respiratory status: acceptable, spontaneous ventilation and nonlabored ventilation  Hydration status: acceptable  Postoperative Nausea and Vomiting: none        There were no known notable events for this encounter.

## 2025-04-11 ASSESSMENT — PAIN SCALES - GENERAL: PAINLEVEL_OUTOF10: 0 - NO PAIN

## 2025-04-25 ENCOUNTER — OFFICE VISIT (OUTPATIENT)
Dept: UROLOGY | Facility: HOSPITAL | Age: 70
End: 2025-04-25
Payer: MEDICARE

## 2025-04-25 DIAGNOSIS — R35.1 NOCTURIA: ICD-10-CM

## 2025-04-25 DIAGNOSIS — R97.20 INCREASED PROSTATE SPECIFIC ANTIGEN (PSA) VELOCITY: Primary | ICD-10-CM

## 2025-04-25 PROCEDURE — 99204 OFFICE O/P NEW MOD 45 MIN: CPT | Performed by: NURSE PRACTITIONER

## 2025-04-25 PROCEDURE — 1159F MED LIST DOCD IN RCRD: CPT | Performed by: NURSE PRACTITIONER

## 2025-04-25 PROCEDURE — 81003 URINALYSIS AUTO W/O SCOPE: CPT | Performed by: NURSE PRACTITIONER

## 2025-04-25 PROCEDURE — 51798 US URINE CAPACITY MEASURE: CPT | Performed by: NURSE PRACTITIONER

## 2025-04-25 PROCEDURE — 99214 OFFICE O/P EST MOD 30 MIN: CPT | Mod: 25 | Performed by: NURSE PRACTITIONER

## 2025-04-25 PROCEDURE — 1123F ACP DISCUSS/DSCN MKR DOCD: CPT | Performed by: NURSE PRACTITIONER

## 2025-04-25 PROCEDURE — 1160F RVW MEDS BY RX/DR IN RCRD: CPT | Performed by: NURSE PRACTITIONER

## 2025-04-25 PROCEDURE — 1036F TOBACCO NON-USER: CPT | Performed by: NURSE PRACTITIONER

## 2025-04-25 PROCEDURE — G2211 COMPLEX E/M VISIT ADD ON: HCPCS | Performed by: NURSE PRACTITIONER

## 2025-04-25 RX ORDER — TOLTERODINE 2 MG/1
2 CAPSULE, EXTENDED RELEASE ORAL DAILY
Qty: 30 CAPSULE | Refills: 0 | Status: SHIPPED | OUTPATIENT
Start: 2025-04-25

## 2025-04-25 NOTE — PROGRESS NOTES
[facility]  Outpatient Clinic Note    Patient Name:  Michael Schwarz  MRN:  [id number]  :  1955    Referring Provider: Fernando Hercules DO  Date of Service: 2025   Visit type: New patient visit     problem list/Chief complaint:  Elevated PSA - 5.46 on 25  Nephrolithiasis  ED      HISTORY OF PRESENT ILLNESS:  Michael Schwarz is a 69 y.o. male with past medical history of CAD s/p bypass graft, gout, mixed HLD, nephrolithiasis, anxiety, PVD, cataracts, ED, microscopic hematuria, who presents for initial Urology visit. I performed a detailed review of the medical chart records lab testing and imaging. Patient referred to Urology for elevated PSA.  Patient reports nocturia which is his most bothersome urinary symptom. He has no other complaints.    Urinary Difficulties? Has nocturia x2, no frequency or urgency, no hematuria, no fever or chills  Unexpected weight loss? no  Bone pain? no  Fatigue? no  Family history of prostate cancer? no  Previous biopsy? no  Smoker? Former, stopped in his 20s      PAST MEDICAL HISTORY:  Medical History[1]    PAST SURGICAL HISTORY:  Surgical History[2]    ALLERGIES:  Allergies[3]    MEDICATIONS:  Current Outpatient Medications   Medication Instructions    allopurinol (ZYLOPRIM) 100 mg, oral, Daily    atorvastatin (LIPITOR) 40 mg, oral, Daily    cholecalciferol (Vitamin D-3) 25 MCG (1000 UT) capsule 1 capsule Orally Once a day    clopidogrel (PLAVIX) 75 mg, oral, Daily    ezetimibe (ZETIA) 10 mg, oral, Daily    metoprolol tartrate (LOPRESSOR) 25 mg, oral, 2 times daily    naproxen (NAPROSYN) 500 mg, oral, 2 times daily PRN    tolterodine LA (DETROL LA) 2 mg, oral, Daily, Do not crush, chew, or split.        SOCIAL HISTORY:  Social History[4]     FAMILY HISTORY:  Family History[5]     REVIEW OF SYSTEMS:  10-pt ROS reviewed and negative except as mentioned above.    Vital signs:  There were no vitals taken for this visit.    PHYSICAL EXAMINATION:  General: Appears  comfortable and in no apparent distress.  Head: Normocephalic, atraumatic  Eyes: Non-injected conjunctiva, sclera clear, no proptosis  Lungs: Breathing is easy, non-labored. Speaking in clear and complete sentences. Normal diaphragmatic movement.  Cardiovascular: no peripheral edema, cyanosis or pallor.   Abdomen: soft, non-distended, non-tender  : Bladder: non tender, not distended  MSK: Ambulatory with steady gait, unassisted  Skin: No visible rashes or lesions  Neurologic: Alert, oriented to person, place, and time  Psychiatric: mood and affect appropriate      IMAGING DATA:     CT AP 8/4/2019  Impression:  1. Mucosal thickening involving the long segment of left colon from the  distal transverse colon down to the proximal sigmoid colon, consistent with  colitis from undetermined cause.  2. Uncomplicated diverticulosis involving the sigmoid colon.  3. A hepatic hemangioma measuring 3.6 cm in maximum diameter involving the  posterior inferior segment of right lobe.  4. A 1.1 cm incidental left adrenal adenoma.  5. No evidence of bowel obstruction or pneumoperitoneum      LABORATORY DATA:    Lab Results   Component Value Date    WBC 6.7 04/02/2025    HGB 13.4 04/02/2025    HCT 39.3 04/02/2025    MCV 92.5 04/02/2025     04/02/2025     Lab Results   Component Value Date    GLUCOSE 79 04/02/2025    CALCIUM 9.4 04/02/2025     04/02/2025    K 4.7 04/02/2025    CO2 29 04/02/2025     04/02/2025    BUN 26 (H) 04/02/2025    CREATININE 0.97 04/02/2025     Lab Results   Component Value Date    PSA 5.46 (H) 04/02/2025    PSA 2.2 01/13/2023    PSA 8.4 (H) 04/23/2021     Office Visit on 04/25/2025   Component Date Value Ref Range Status    POC Color, Urine 04/25/2025 Yellow  Straw, Yellow, Light-Yellow Final    POC Appearance, Urine 04/25/2025 Clear  Clear Final    POC Glucose, Urine 04/25/2025 NEGATIVE  NEGATIVE mg/dl Final    POC Bilirubin, Urine 04/25/2025 NEGATIVE  NEGATIVE Final    POC Ketones, Urine  "04/25/2025 NEGATIVE  NEGATIVE mg/dl Final    POC Specific Gravity, Urine 04/25/2025 >=1.030  1.005 - 1.035 Final    POC Blood, Urine 04/25/2025 NEGATIVE  NEGATIVE Final    POC PH, Urine 04/25/2025 5.5  No Reference Range Established PH Final    POC Protein, Urine 04/25/2025 NEGATIVE  NEGATIVE mg/dl Final    POC Urobilinogen, Urine 04/25/2025 0.2  0.2, 1.0 EU/DL Final    Poc Nitrite, Urine 04/25/2025 NEGATIVE  NEGATIVE Final    POC Leukocytes, Urine 04/25/2025 NEGATIVE  NEGATIVE Final         ASSESSMENT:  Michael Schwarz is a 69 y.o. male with nephrolithiasis, ED, elevated PSA, who presents for initial Urology visit.    1.Today we discussed PSA as a tool to screen gentleman for prostate cancer. We discussed Prostate cancer screening, and that it is recommended for men aged 55-69, but can also start early in high risk patients ( and patients with family history of prostate cancer) and go longer in active, healthy men.  We discussed the screening process involves a digital rectal exam (TEJ) and blood test (PSA).      We discussed that many factors can elevate the PSA, and when the PSA is elevated further testing is warranted.     I discussed with the patient that the prostate MRI has a high sensitivity for high-grade prostate cancer lesions but a lower sensitivity for low to intermediate prostate cancer lesions. We discussed that currently MRI is not considered to be a standard of care for prostate cancer screening, therefore self-pay option is available.    2.Today we discussed the definition of Overactive Bladder (OAB) as a clinical diagnosis that refers to \"urinary urgency, usually accompanied by frequency and nocturia, with or without urge incontinence, in the absence of urinary tract infection or any other obvious pathology.\" We discussed in detail the risk factors for OAB including bladder inflammation, chronic bladder outlet obstruction (BPH, urethral stenosis), central nervous systems disorders. I " had a long discussion with patient regarding the first line treatment for OAB is behavioral therapy with or without medication therapy.     Behavioral therapy include the following elements:   1) Avoid activities that exacerbate incontinence  2) Maintain a voiding diary  3) Timed/scheduled voiding to empty the bladder before incontinence or severe urgency occurs  4) Bladder training  5) Kegel exercises and pelvic floor muscle training  6) Fluid intake management-avoid excessive fluid intake  7) Dietary management-avoid bladder irritants (caffeine, alcohol, spicy food, acidic food)  8) Avoid constipation  9) Stop smoking (If currently smoking)    Patient was informed that second line treatment includes medications. We discussed Mirabegron and that the side effects include possible increase in blood pressure in a small minority of patients, however insurance does not always cover this. We also discussed anticholinergic medications which can have the side effects of dry eyes, dry mouth, constipation and rarely cognitive changes.    I mentioned 3rd line management options of neuromodulation and Botox injections as well    PLAN:  -UA today negative for infection or blood  -PVR 32 ml  -Prescription for Tolterodine 2 mg daily sent to pharmacy  -Patient to implement dietary and behavioral modifications to help with OAB  -Patient will take metamucil and miralax as needed to prevent constipation  -Repeat PSA in 3 months  -Follow up in 3 months, or sooner if needed    All questions and concerns were addressed. Patient verbalizes understanding and has no other questions at this time.     E&M visit today is associated with current or anticipated ongoing medical care services related to a patient's single, serious condition or a complex condition.    MAX Hernandez-CNP  [facility]  4/25/2025 3:26 PM         [1]   Past Medical History:  Diagnosis Date    CAD (coronary artery disease)     Gout     Hyperlipidemia      Myocardial infarction (Multi) on May 14, 2022   [2]   Past Surgical History:  Procedure Laterality Date    COLONOSCOPY      CORONARY ARTERY BYPASS GRAFT  05/19/2022    LIMA to the LAD LEO to RCA and SVG to diagonal with a jump to OM1    CYSTOSCOPY     [3] No Known Allergies  [4]   Social History  Tobacco Use    Smoking status: Never     Passive exposure: Never    Smokeless tobacco: Never   Vaping Use    Vaping status: Never Used   Substance Use Topics    Alcohol use: Yes     Comment: 2 beers month    Drug use: Never   [5]   Family History  Problem Relation Name Age of Onset    Stroke Mother Carlosrafa Karishma     Diabetes Father Andres Schwarz     Heart disease Father Andres Schwarz     Arthritis Sister Carlie Jacksonlin     No Known Problems Sister      Other (mini stroke) Brother Dayton Schwarz     Stroke Brother Dayton Schwarz     No Known Problems Daughter      No Known Problems Son      No Known Problems Son

## 2025-05-06 DIAGNOSIS — E78.2 MIXED HYPERLIPIDEMIA: ICD-10-CM

## 2025-05-06 RX ORDER — EZETIMIBE 10 MG/1
10 TABLET ORAL DAILY
Qty: 90 TABLET | Refills: 0 | Status: SHIPPED | OUTPATIENT
Start: 2025-05-06

## 2025-07-25 DIAGNOSIS — R97.20 INCREASED PROSTATE SPECIFIC ANTIGEN (PSA) VELOCITY: ICD-10-CM

## 2025-07-31 DIAGNOSIS — Z95.1 HX OF CABG: ICD-10-CM

## 2025-08-01 RX ORDER — CLOPIDOGREL BISULFATE 75 MG/1
75 TABLET ORAL DAILY
Qty: 90 TABLET | Refills: 3 | Status: SHIPPED | OUTPATIENT
Start: 2025-08-01

## 2025-08-07 ENCOUNTER — TELEPHONE (OUTPATIENT)
Dept: UROLOGY | Facility: CLINIC | Age: 70
End: 2025-08-07
Payer: MEDICARE

## 2025-08-07 NOTE — TELEPHONE ENCOUNTER
Patient called in regards to lab orders he was supposed to have done prior to his appointment.  After hearing patent's name on his VM, it was explained that the orders are in epic and he can go to any  lab or [facility] to have PSA done.

## 2025-08-12 LAB — PSA SERPL-MCNC: 3.5 NG/ML

## 2025-08-14 ENCOUNTER — TELEPHONE (OUTPATIENT)
Dept: UROLOGY | Facility: CLINIC | Age: 70
End: 2025-08-14
Payer: MEDICARE

## 2025-08-15 ENCOUNTER — APPOINTMENT (OUTPATIENT)
Dept: UROLOGY | Facility: HOSPITAL | Age: 70
End: 2025-08-15
Payer: MEDICARE

## 2025-08-15 DIAGNOSIS — R97.20 ELEVATED PSA: Primary | ICD-10-CM

## 2025-08-15 PROCEDURE — 51798 US URINE CAPACITY MEASURE: CPT | Performed by: NURSE PRACTITIONER

## 2025-08-15 PROCEDURE — G2211 COMPLEX E/M VISIT ADD ON: HCPCS | Performed by: NURSE PRACTITIONER

## 2025-08-15 PROCEDURE — 1159F MED LIST DOCD IN RCRD: CPT | Performed by: NURSE PRACTITIONER

## 2025-08-15 PROCEDURE — 99213 OFFICE O/P EST LOW 20 MIN: CPT | Performed by: NURSE PRACTITIONER

## 2025-08-15 PROCEDURE — 1160F RVW MEDS BY RX/DR IN RCRD: CPT | Performed by: NURSE PRACTITIONER

## 2025-08-15 PROCEDURE — 1036F TOBACCO NON-USER: CPT | Performed by: NURSE PRACTITIONER
